# Patient Record
Sex: MALE | Race: WHITE | URBAN - METROPOLITAN AREA
[De-identification: names, ages, dates, MRNs, and addresses within clinical notes are randomized per-mention and may not be internally consistent; named-entity substitution may affect disease eponyms.]

---

## 2023-05-02 ENCOUNTER — APPOINTMENT (RX ONLY)
Dept: URBAN - METROPOLITAN AREA CLINIC 141 | Facility: CLINIC | Age: 58
Setting detail: DERMATOLOGY
End: 2023-05-02

## 2023-05-02 DIAGNOSIS — L81.4 OTHER MELANIN HYPERPIGMENTATION: ICD-10-CM

## 2023-05-02 DIAGNOSIS — L57.8 OTHER SKIN CHANGES DUE TO CHRONIC EXPOSURE TO NONIONIZING RADIATION: ICD-10-CM

## 2023-05-02 DIAGNOSIS — L82.1 OTHER SEBORRHEIC KERATOSIS: ICD-10-CM

## 2023-05-02 DIAGNOSIS — Z71.89 OTHER SPECIFIED COUNSELING: ICD-10-CM

## 2023-05-02 DIAGNOSIS — L73.8 OTHER SPECIFIED FOLLICULAR DISORDERS: ICD-10-CM

## 2023-05-02 PROCEDURE — 99203 OFFICE O/P NEW LOW 30 MIN: CPT

## 2023-05-02 PROCEDURE — ? COUNSELING

## 2023-05-02 ASSESSMENT — LOCATION SIMPLE DESCRIPTION DERM
LOCATION SIMPLE: SUPERIOR FOREHEAD
LOCATION SIMPLE: INFERIOR FOREHEAD
LOCATION SIMPLE: LEFT CHEEK
LOCATION SIMPLE: RIGHT CHEEK
LOCATION SIMPLE: SCALP
LOCATION SIMPLE: LEFT FOREHEAD

## 2023-05-02 ASSESSMENT — LOCATION ZONE DERM
LOCATION ZONE: SCALP
LOCATION ZONE: FACE

## 2023-05-02 ASSESSMENT — LOCATION DETAILED DESCRIPTION DERM
LOCATION DETAILED: RIGHT INFERIOR PARIETAL SCALP
LOCATION DETAILED: LEFT CENTRAL FRONTAL SCALP
LOCATION DETAILED: INFERIOR MID FOREHEAD
LOCATION DETAILED: SUPERIOR MID FOREHEAD
LOCATION DETAILED: LEFT SUPERIOR LATERAL FOREHEAD
LOCATION DETAILED: RIGHT CENTRAL MALAR CHEEK
LOCATION DETAILED: LEFT MEDIAL MALAR CHEEK

## 2023-12-27 NOTE — PROCEDURE: COUNSELING
12/27/23      Enoch Bates  [address] [facility] [address]      Dear Enoch:    We are committed to helping you live well during and after your hospital stay. Through our Care Transitions Program, we give you and your family individualized support during your transition home.     Upon leaving either the hospital or skilled facility, you will be given a Care   Transitions Nurse. Our priority is to help you with your recovery once you get   home and get back to the things you need and want to do.  Services are free of   charge.    How it Works:    Your Transitions Nurse will call you within 24 to 72 hours after discharge. If helpful to you, a family member may be there for this call. We will review the following items to make sure you have everything you need as part of your discharge plan:    Your written discharge instructions and care plan  Your meds  Your next doctor’s visit(s)  Any health or other concerns    Over the next 30 days, your Transitions Nurse will call you, most often once a week. These calls give you the chance to ask questions about your health care needs.    Your Transitions Nurse can link you with your doctor(s) and services as needed and give helpful facts to help keep you on track throughout your healing.    Your Transitions Nurse is available to you Monday thru Friday, 8am to 4:30pm at [phone].    We look forward to working with you on your health care journey.    Sincerely,    Ying OCONNOR RN  Care Transitions Nurse?   [facility]  
Detail Level: Generalized
Detail Level: Detailed
Detail Level: Zone